# Patient Record
Sex: FEMALE | Race: BLACK OR AFRICAN AMERICAN | NOT HISPANIC OR LATINO | URBAN - METROPOLITAN AREA
[De-identification: names, ages, dates, MRNs, and addresses within clinical notes are randomized per-mention and may not be internally consistent; named-entity substitution may affect disease eponyms.]

---

## 2023-09-19 ENCOUNTER — TELEPHONE (OUTPATIENT)
Dept: SURGERY | Facility: CLINIC | Age: 28
End: 2023-09-19
Payer: MEDICAID

## 2023-09-19 NOTE — TELEPHONE ENCOUNTER
Pt called back to schedule pt requested an appt in November due to work schedule pt was schedule for Nov. 2nd at 3pm    ----- Message from Ginna Jauregui sent at 9/13/2023  2:14 PM CDT -----  Contact: Maranda Low is needing to schedule an appointment as the pain in her left breast is back and worse. Please call her at [phone]

## 2023-10-26 PROBLEM — N60.02 CYST OF BREAST, LEFT, BENIGN SOLITARY: Status: ACTIVE | Noted: 2023-10-26

## 2023-10-26 PROBLEM — N64.4 MASTODYNIA OF LEFT BREAST: Status: ACTIVE | Noted: 2023-10-26

## 2023-10-26 PROBLEM — N63.22 BREAST LUMP ON LEFT SIDE AT 10 O'CLOCK POSITION: Status: ACTIVE | Noted: 2023-10-26

## 2024-02-20 ENCOUNTER — TELEPHONE (OUTPATIENT)
Dept: SURGERY | Facility: CLINIC | Age: 29
End: 2024-02-20
Payer: MEDICAID

## 2024-03-11 ENCOUNTER — TELEPHONE (OUTPATIENT)
Dept: SURGERY | Facility: CLINIC | Age: 29
End: 2024-03-11
Payer: MEDICAID

## 2024-03-11 NOTE — TELEPHONE ENCOUNTER
I returned patient's phone call from this morning. Patient wants to be seen for left breast pain after following fibrocystic protocol. I offered available dates to patient, but she was unsure about the available dates at the time. She stated she would give me a call back at the end of the day to be scheduled.

## 2024-03-21 ENCOUNTER — TELEPHONE (OUTPATIENT)
Dept: SURGERY | Facility: CLINIC | Age: 29
End: 2024-03-21
Payer: MEDICAID

## 2024-03-21 NOTE — TELEPHONE ENCOUNTER
Attempted to contact patient. Pt is unable to make appointment tomorrow, 3-21-24. We are not currently accepting new patients, but due to patient being seen previously with Dr. Flannery's practice, we are working to try to accommodate patient. This is the 2nd cancellation by patient 1 day before appointment. Ok to schedule patient for next available, but unable to work in patient next week due to no availability. Pt given call back number, AL, LPN.

## 2024-04-16 NOTE — PROGRESS NOTES
[facility]  MD Shannon Seals NP-C    Date of Service: 4/17/2024      Chief Complaint:   Maranda Washington is a 28 y.o. female presenting today for bilateral breast pain. The left breast pain is worse than the right. that has not resolved following the [facility] protocol. She reports a red bump on her left breast that has resolved. She was last seen 7/26/2022.     History of Present Illness:    Mrs. Washington first presented on January 25, 2022 with a left breast pain and a left breast lump that on imaging was consistent with benign cystic change that has remained stable. MD::: Isabel Rand NP    Past Medical History:   Diagnosis Date    Breast lump on left side at 10 o'clock position 10/26/2023    Cyst of breast, left, benign solitary 10/26/2023    Depression     Kidney infection     Mastodynia of left breast 10/26/2023      No past surgical history on file.   No current outpatient medications on file.   Review of patient's allergies indicates:  No Known Allergies   Social History     Tobacco Use    Smoking status: Never    Smokeless tobacco: Never   Substance Use Topics    Alcohol use: Not Currently      Family History   Problem Relation Name Age of Onset    Ovarian cancer Neg Hx      Breast cancer Neg Hx          Review of Systems   Integumentary:  Positive for breast mass. Negative for color change, rash, mole/lesion, breast discharge and breast tenderness.   Breast: Positive for mass.Negative for tenderness       Physical Exam   HENT:   Head: Normocephalic.   Pulmonary/Chest: Right breast exhibits no inverted nipple, no mass, no nipple discharge, no skin change and no tenderness. Left breast exhibits no inverted nipple, no mass, no nipple discharge, no skin change and no tenderness. No breast swelling.   Genitourinary: No breast swelling.   Musculoskeletal: Lymphadenopathy:      Upper Body:      Right upper body: No supraclavicular or axillary adenopathy.       Left upper body: No supraclavicular or axillary adenopathy.     Neurological: She is alert.          Assessment/Plan  1. Mastodynia of left breast  Assessment & Plan:  We discussed our fibrocystic mastopathy protocol in detail. She knows that if she follows this protocol - that her symptoms should improve.  We discussed how breast pain is usually not associated with breast cancer, however, pain can be the presenting symptom with some cancers (but this could be coincidental). Still, if her pain does not improve in 8-12 weeks she should call us back for additional recommendations.        2. Breast lump on left side at 10 o'clock position  Assessment & Plan:  We reviewed our findings today and her questions were answered.  She understands that her  exams have remained stable (and show nothing concerning).  She is comfortable being followed in a conservative fashion.      She understands the importance of monthly self-breast examination and knows to report any and all changes as they occur.    NOTE:::We viewed her films together at today's visit.  We discussed the multiple views obtained and the important findings.  Even benign changes were mentioned and her questions were answered.  She knows that she may receive a formal letter or report from the Radiologist.  She is to contact us if she has questions.         3. Cyst of breast, left, benign solitary  Assessment & Plan:  We discussed our Fibrocystic Mastopathy Protocol in detail. She should take Vitamin E 800 IU everyday x 3 months or until non-tender then can stop Vitamin E vs. continue daily at 400 IU.  The use of ice packs or warms soaks to tender area of the breast may also be of some benefit.  If warm soaks help her tenderness - She can use Aspercreme (unless allergic to Aspirin) on the affected area.  Ibuprofen (if no contraindications) at 800 mg three times per day for 5 days can also relieve many symptoms associated with swollen or inflamed tissue.  She can  repeat Ibuprofen for 5 days, but then should be off for 5 days as it may cause gastric upset.  It is a good idea to wear a tight bra during the day and night to minimize movement of the tender area (Sports Bras work well).  Evening Primrose Oil can be bought over the counter and used at a dose of 3000 mg per day to help with any breast pain/tenderness not improved by implementing the above measures.                 Medical Decision Making:  It is my impression that this patient suffers all conditions contained in this medical document.  Each of these conditions did affect our plan of care and my medical decision making today.  It is my opinion that the medical decision making concerning this patient was of moderate difficulty based on the aforementioned conditions.  Any further recommendations will be communicated to the patient by me.  I have reviewed and verified her allergies, list of medications, medical and surgical histories, social history, and a pertinent review of symptoms.      Follow up:  6 months and prn    For:  Physical Examination

## 2024-04-16 NOTE — ASSESSMENT & PLAN NOTE
We reviewed our findings today and her questions were answered.  She understands that her  exams have remained stable (and show nothing concerning).  She is comfortable being followed in a conservative fashion.      She understands the importance of monthly self-breast examination and knows to report any and all changes as they occur.    NOTE:::We viewed her films together at today's visit.  We discussed the multiple views obtained and the important findings.  Even benign changes were mentioned and her questions were answered.  She knows that she may receive a formal letter or report from the Radiologist.  She is to contact us if she has questions.

## 2024-04-17 ENCOUNTER — OFFICE VISIT (OUTPATIENT)
Dept: SURGERY | Facility: CLINIC | Age: 29
End: 2024-04-17
Payer: MEDICAID

## 2024-04-17 VITALS — BODY MASS INDEX: 44.98 KG/M2 | HEIGHT: 65 IN | WEIGHT: 270 LBS

## 2024-04-17 DIAGNOSIS — N64.4 MASTODYNIA OF LEFT BREAST: Primary | ICD-10-CM

## 2024-04-17 DIAGNOSIS — N63.22 BREAST LUMP ON LEFT SIDE AT 10 O'CLOCK POSITION: ICD-10-CM

## 2024-04-17 DIAGNOSIS — N60.02: ICD-10-CM

## 2024-04-17 PROCEDURE — 3008F BODY MASS INDEX DOCD: CPT | Mod: CPTII,,, | Performed by: NURSE PRACTITIONER

## 2024-04-17 PROCEDURE — 99999 PR PBB SHADOW E&M-EST. PATIENT-LVL II: CPT | Mod: PBBFAC,,, | Performed by: NURSE PRACTITIONER

## 2024-04-17 PROCEDURE — 99213 OFFICE O/P EST LOW 20 MIN: CPT | Mod: S$PBB,,, | Performed by: NURSE PRACTITIONER

## 2024-04-17 PROCEDURE — 99212 OFFICE O/P EST SF 10 MIN: CPT | Mod: PBBFAC,PN | Performed by: NURSE PRACTITIONER

## 2024-04-17 PROCEDURE — 1160F RVW MEDS BY RX/DR IN RCRD: CPT | Mod: CPTII,,, | Performed by: NURSE PRACTITIONER

## 2024-04-17 PROCEDURE — 1159F MED LIST DOCD IN RCRD: CPT | Mod: CPTII,,, | Performed by: NURSE PRACTITIONER

## 2024-10-23 ENCOUNTER — OFFICE VISIT (OUTPATIENT)
Dept: SURGERY | Facility: CLINIC | Age: 29
End: 2024-10-23
Payer: MEDICAID

## 2024-10-23 DIAGNOSIS — N63.22 BREAST LUMP ON LEFT SIDE AT 10 O'CLOCK POSITION: Primary | ICD-10-CM

## 2024-10-23 DIAGNOSIS — N64.4 MASTODYNIA OF LEFT BREAST: ICD-10-CM

## 2024-10-23 DIAGNOSIS — N60.02: ICD-10-CM

## 2024-10-23 PROCEDURE — 1160F RVW MEDS BY RX/DR IN RCRD: CPT | Mod: CPTII,,, | Performed by: NURSE PRACTITIONER

## 2024-10-23 PROCEDURE — 99212 OFFICE O/P EST SF 10 MIN: CPT | Mod: PBBFAC,PN | Performed by: NURSE PRACTITIONER

## 2024-10-23 PROCEDURE — 1159F MED LIST DOCD IN RCRD: CPT | Mod: CPTII,,, | Performed by: NURSE PRACTITIONER

## 2024-10-23 PROCEDURE — 99213 OFFICE O/P EST LOW 20 MIN: CPT | Mod: S$PBB,,, | Performed by: NURSE PRACTITIONER

## 2024-10-23 PROCEDURE — 99999 PR PBB SHADOW E&M-EST. PATIENT-LVL II: CPT | Mod: PBBFAC,,, | Performed by: NURSE PRACTITIONER

## 2024-10-23 RX ORDER — BUPROPION HYDROCHLORIDE 150 MG/1
150 TABLET ORAL
COMMUNITY
Start: 2024-10-15

## 2024-10-23 NOTE — PROGRESS NOTES
[facility]  MD Shannon Seals NP-C    Date of Service: 10/23/2024      Chief Complaint:   Maranda Washington is a 29 y.o. female presenting today for  6 month follow up. She is due for Physical Examination  She reports no interval changes on her self-breast examination.     History of Present Illness:   Mrs. Washington first presented on January 25, 2022 with a left breast pain and a left breast lump that on imaging was consistent with benign cystic change that has remained stable. MD::: Isabel Rand NP     Past Medical History:   Diagnosis Date    Breast lump on left side at 10 o'clock position 10/26/2023    Cyst of breast, left, benign solitary 10/26/2023    Depression     Kidney infection     Mastodynia of left breast 10/26/2023      History reviewed. No pertinent surgical history.     Current Outpatient Medications:     buPROPion (WELLBUTRIN XL) 150 MG TB24 tablet, Take 150 mg by mouth., Disp: , Rfl:    Review of patient's allergies indicates:  No Known Allergies   Social History     Tobacco Use    Smoking status: Never    Smokeless tobacco: Never   Substance Use Topics    Alcohol use: Not Currently      Family History   Problem Relation Name Age of Onset    Ovarian cancer Neg Hx      Breast cancer Neg Hx          Review of Systems   Integumentary:  Negative for color change, rash, mole/lesion, breast mass, breast discharge and breast tenderness.   Breast: Negative for mass and tenderness       Physical Exam   HENT:   Head: Normocephalic.   Pulmonary/Chest: Right breast exhibits no inverted nipple, no mass, no nipple discharge, no skin change and no tenderness. Left breast exhibits no inverted nipple, no mass, no nipple discharge, no skin change and no tenderness. No breast swelling.   Genitourinary: No breast swelling.   Musculoskeletal: Lymphadenopathy:      Upper Body:      Right upper body: No supraclavicular or axillary adenopathy.      Left upper  body: No supraclavicular or axillary adenopathy.     Neurological: She is alert.          Assessment/Plan  1. Breast lump on left side at 10 o'clock position  Assessment & Plan:  We reviewed our findings today and her questions were answered.  She understands that her  exams have remained stable (and show nothing concerning).  She is comfortable being followed in a conservative fashion.      She understands the importance of monthly self-breast examination and knows to report any and all changes as they occur.            2. Cyst of breast, left, benign solitary  Assessment & Plan:  We discussed our Fibrocystic Mastopathy Protocol in detail. She should take Vitamin E 800 IU everyday x 3 months or until non-tender then can stop Vitamin E vs. continue daily at 400 IU.  The use of ice packs or warms soaks to tender area of the breast may also be of some benefit.  If warm soaks help her tenderness - She can use Aspercreme (unless allergic to Aspirin) on the affected area.  Ibuprofen (if no contraindications) at 800 mg three times per day for 5 days can also relieve many symptoms associated with swollen or inflamed tissue.  She can repeat Ibuprofen for 5 days, but then should be off for 5 days as it may cause gastric upset.  It is a good idea to wear a tight bra during the day and night to minimize movement of the tender area (Sports Bras work well).  Evening Primrose Oil can be bought over the counter and used at a dose of 3000 mg per day to help with any breast pain/tenderness not improved by implementing the above measures.        3. Mastodynia of left breast  Assessment & Plan:  We discussed our fibrocystic mastopathy protocol in detail. She knows that if she follows this protocol - that her symptoms should improve.  We discussed how breast pain is usually not associated with breast cancer, however, pain can be the presenting symptom with some cancers (but this could be coincidental). Still, if her pain does not  improve in 8-12 weeks she should call us back for additional recommendations.               Medical Decision Making:  It is my impression that this patient suffers all conditions contained in this medical document.  Each of these conditions did affect our plan of care and my medical decision making today.  It is my opinion that the medical decision making concerning this patient was of moderate difficulty based on the aforementioned conditions.  Any further recommendations will be communicated to the patient by me.  I have reviewed and verified her allergies, list of medications, medical and surgical histories, social history, and a pertinent review of symptoms.      Follow up:  1 year and prn    For: PE

## 2024-10-23 NOTE — ASSESSMENT & PLAN NOTE
We reviewed our findings today and her questions were answered.  She understands that her  exams have remained stable (and show nothing concerning).  She is comfortable being followed in a conservative fashion.      She understands the importance of monthly self-breast examination and knows to report any and all changes as they occur.